# Patient Record
Sex: MALE | Race: WHITE | NOT HISPANIC OR LATINO | Employment: OTHER | URBAN - METROPOLITAN AREA
[De-identification: names, ages, dates, MRNs, and addresses within clinical notes are randomized per-mention and may not be internally consistent; named-entity substitution may affect disease eponyms.]

---

## 2019-08-20 ENCOUNTER — HOSPITAL ENCOUNTER (OUTPATIENT)
Dept: RADIOLOGY | Facility: MEDICAL CENTER | Age: 54
End: 2019-08-20
Attending: NURSE PRACTITIONER

## 2019-08-20 DIAGNOSIS — B18.2 CHRONIC HEPATITIS C WITH HEPATIC COMA (HCC): ICD-10-CM

## 2023-08-28 ENCOUNTER — APPOINTMENT (OUTPATIENT)
Dept: RADIOLOGY | Facility: MEDICAL CENTER | Age: 58
End: 2023-08-28
Attending: EMERGENCY MEDICINE
Payer: MEDICARE

## 2023-08-28 ENCOUNTER — APPOINTMENT (OUTPATIENT)
Dept: URGENT CARE | Facility: PHYSICIAN GROUP | Age: 58
End: 2023-08-28
Payer: MEDICARE

## 2023-08-28 ENCOUNTER — HOSPITAL ENCOUNTER (EMERGENCY)
Facility: MEDICAL CENTER | Age: 58
End: 2023-08-28
Attending: EMERGENCY MEDICINE
Payer: MEDICARE

## 2023-08-28 VITALS
SYSTOLIC BLOOD PRESSURE: 145 MMHG | DIASTOLIC BLOOD PRESSURE: 92 MMHG | BODY MASS INDEX: 29.74 KG/M2 | HEIGHT: 68 IN | TEMPERATURE: 98.4 F | HEART RATE: 80 BPM | OXYGEN SATURATION: 95 % | RESPIRATION RATE: 16 BRPM | WEIGHT: 196.21 LBS

## 2023-08-28 DIAGNOSIS — R10.32 LEFT LOWER QUADRANT ABDOMINAL PAIN: ICD-10-CM

## 2023-08-28 DIAGNOSIS — N20.1 URETEROLITHIASIS: ICD-10-CM

## 2023-08-28 LAB
ALBUMIN SERPL BCP-MCNC: 4.1 G/DL (ref 3.2–4.9)
ALBUMIN/GLOB SERPL: 1.7 G/DL
ALP SERPL-CCNC: 88 U/L (ref 30–99)
ALT SERPL-CCNC: 14 U/L (ref 2–50)
ANION GAP SERPL CALC-SCNC: 9 MMOL/L (ref 7–16)
APPEARANCE UR: CLEAR
AST SERPL-CCNC: 17 U/L (ref 12–45)
BASOPHILS # BLD AUTO: 0.3 % (ref 0–1.8)
BASOPHILS # BLD: 0.04 K/UL (ref 0–0.12)
BILIRUB SERPL-MCNC: 0.7 MG/DL (ref 0.1–1.5)
BILIRUB UR QL STRIP.AUTO: NEGATIVE
BUN SERPL-MCNC: 13 MG/DL (ref 8–22)
CALCIUM ALBUM COR SERPL-MCNC: 8.9 MG/DL (ref 8.5–10.5)
CALCIUM SERPL-MCNC: 9 MG/DL (ref 8.5–10.5)
CHLORIDE SERPL-SCNC: 102 MMOL/L (ref 96–112)
CO2 SERPL-SCNC: 26 MMOL/L (ref 20–33)
COLOR UR: YELLOW
CREAT SERPL-MCNC: 1.32 MG/DL (ref 0.5–1.4)
EOSINOPHIL # BLD AUTO: 0.01 K/UL (ref 0–0.51)
EOSINOPHIL NFR BLD: 0.1 % (ref 0–6.9)
ERYTHROCYTE [DISTWIDTH] IN BLOOD BY AUTOMATED COUNT: 44.7 FL (ref 35.9–50)
GFR SERPLBLD CREATININE-BSD FMLA CKD-EPI: 62 ML/MIN/1.73 M 2
GLOBULIN SER CALC-MCNC: 2.4 G/DL (ref 1.9–3.5)
GLUCOSE SERPL-MCNC: 130 MG/DL (ref 65–99)
GLUCOSE UR STRIP.AUTO-MCNC: NEGATIVE MG/DL
HCT VFR BLD AUTO: 47.5 % (ref 42–52)
HGB BLD-MCNC: 15.6 G/DL (ref 14–18)
IMM GRANULOCYTES # BLD AUTO: 0.05 K/UL (ref 0–0.11)
IMM GRANULOCYTES NFR BLD AUTO: 0.4 % (ref 0–0.9)
KETONES UR STRIP.AUTO-MCNC: ABNORMAL MG/DL
LEUKOCYTE ESTERASE UR QL STRIP.AUTO: NEGATIVE
LIPASE SERPL-CCNC: 15 U/L (ref 11–82)
LYMPHOCYTES # BLD AUTO: 0.73 K/UL (ref 1–4.8)
LYMPHOCYTES NFR BLD: 5.5 % (ref 22–41)
MCH RBC QN AUTO: 30.3 PG (ref 27–33)
MCHC RBC AUTO-ENTMCNC: 32.8 G/DL (ref 32.3–36.5)
MCV RBC AUTO: 92.2 FL (ref 81.4–97.8)
MICRO URNS: ABNORMAL
MONOCYTES # BLD AUTO: 0.41 K/UL (ref 0–0.85)
MONOCYTES NFR BLD AUTO: 3.1 % (ref 0–13.4)
NEUTROPHILS # BLD AUTO: 12.12 K/UL (ref 1.82–7.42)
NEUTROPHILS NFR BLD: 90.6 % (ref 44–72)
NITRITE UR QL STRIP.AUTO: NEGATIVE
NRBC # BLD AUTO: 0 K/UL
NRBC BLD-RTO: 0 /100 WBC (ref 0–0.2)
PH UR STRIP.AUTO: 7.5 [PH] (ref 5–8)
PLATELET # BLD AUTO: 344 K/UL (ref 164–446)
PMV BLD AUTO: 9.1 FL (ref 9–12.9)
POTASSIUM SERPL-SCNC: 4.5 MMOL/L (ref 3.6–5.5)
PROT SERPL-MCNC: 6.5 G/DL (ref 6–8.2)
PROT UR QL STRIP: NEGATIVE MG/DL
RBC # BLD AUTO: 5.15 M/UL (ref 4.7–6.1)
RBC UR QL AUTO: NEGATIVE
SODIUM SERPL-SCNC: 137 MMOL/L (ref 135–145)
SP GR UR STRIP.AUTO: 1.02
UROBILINOGEN UR STRIP.AUTO-MCNC: 1 MG/DL
WBC # BLD AUTO: 13.4 K/UL (ref 4.8–10.8)

## 2023-08-28 PROCEDURE — 80053 COMPREHEN METABOLIC PANEL: CPT

## 2023-08-28 PROCEDURE — 76870 US EXAM SCROTUM: CPT

## 2023-08-28 PROCEDURE — 700111 HCHG RX REV CODE 636 W/ 250 OVERRIDE (IP): Mod: JZ | Performed by: EMERGENCY MEDICINE

## 2023-08-28 PROCEDURE — 85025 COMPLETE CBC W/AUTO DIFF WBC: CPT

## 2023-08-28 PROCEDURE — 74176 CT ABD & PELVIS W/O CONTRAST: CPT

## 2023-08-28 PROCEDURE — 99284 EMERGENCY DEPT VISIT MOD MDM: CPT

## 2023-08-28 PROCEDURE — 83690 ASSAY OF LIPASE: CPT

## 2023-08-28 PROCEDURE — 96372 THER/PROPH/DIAG INJ SC/IM: CPT

## 2023-08-28 PROCEDURE — 81003 URINALYSIS AUTO W/O SCOPE: CPT

## 2023-08-28 RX ORDER — ONDANSETRON 4 MG/1
4 TABLET, ORALLY DISINTEGRATING ORAL EVERY 8 HOURS PRN
Qty: 10 TABLET | Refills: 0 | Status: SHIPPED | OUTPATIENT
Start: 2023-08-28

## 2023-08-28 RX ORDER — KETOROLAC TROMETHAMINE 30 MG/ML
30 INJECTION, SOLUTION INTRAMUSCULAR; INTRAVENOUS ONCE
Status: COMPLETED | OUTPATIENT
Start: 2023-08-28 | End: 2023-08-28

## 2023-08-28 RX ORDER — NAPROXEN 500 MG/1
500 TABLET ORAL 2 TIMES DAILY WITH MEALS
Qty: 20 TABLET | Refills: 0 | Status: SHIPPED | OUTPATIENT
Start: 2023-08-28

## 2023-08-28 RX ADMIN — KETOROLAC TROMETHAMINE 30 MG: 30 INJECTION, SOLUTION INTRAMUSCULAR; INTRAVENOUS at 15:02

## 2023-08-28 NOTE — ED TRIAGE NOTES
"Chief Complaint   Patient presents with    Abdominal Pain     Left sided     Testicle Pain     Left sided     Pt reports sudden onset abdominal and testicle pain that started this morning. Protocol ordered.     Pt is alert and oriented, speaking in full sentences, follows commands and responds appropriately to questions.      Pt placed in lobby. Pt educated on triage process and apologized for wait time. Pt encouraged to alert staff for any changes.     Patient and staff wearing appropriate PPE      BP (!) 155/92   Pulse 75   Temp 36.8 °C (98.3 °F) (Temporal)   Resp 16   Ht 1.727 m (5' 8\")   Wt 89 kg (196 lb 3.4 oz)   SpO2 97%       "

## 2023-08-28 NOTE — ED PROVIDER NOTES
ED Provider Note    CHIEF COMPLAINT  Chief Complaint   Patient presents with    Abdominal Pain     Left sided     Testicle Pain     Left sided       EXTERNAL RECORDS REVIEWED  Urology documentation 2023, prostatic hypertrophy, prostate tumor, impotence    HPI/ROS    Danyel Franklin is a 58 y.o. male who presents for evaluation of left-sided lower abdominal pain and left testicle pain that began acutely and severely earlier this morning.  Woke him from sleep.  Associated with vomiting.  He denies fever.  No diarrhea.  States he has a distant history of kidney stones, none since 10 years of age.  No burning or blood with urination.  No fever.  History of bipolar disorder and hepatitis as well as COPD and rheumatoid arthritis.  He also apparently has a history of prostate cancer.  He has a former cigarette smoker    PAST MEDICAL HISTORY   has a past medical history of Bipolar disorder (HCC), Chronic leg pain, COPD (chronic obstructive pulmonary disease) (HCC), Depression, Hepatitis C, Kidney stones, and Rheumatoid arthritis (HCC).    SURGICAL HISTORY   has a past surgical history that includes cystoscopy.    FAMILY HISTORY  No family history on file.    SOCIAL HISTORY  Social History     Tobacco Use    Smoking status: Former     Current packs/day: 0.00     Average packs/day: 0.5 packs/day for 41.0 years (20.5 ttl pk-yrs)     Types: Cigarettes     Start date: 1974     Quit date: 2015     Years since quittin.0    Smokeless tobacco: Not on file   Substance and Sexual Activity    Alcohol use: No    Drug use: No     Types: Intravenous     Comment: hx of IV speed use, last use .  Not currently    Sexual activity: Not on file       CURRENT MEDICATIONS  Home Medications    **Home medications have not yet been reviewed for this encounter**         ALLERGIES  No Known Allergies    PHYSICAL EXAM  VITAL SIGNS: BP (!) 155/92   Pulse 75   Temp 36.8 °C (98.3 °F) (Temporal)   Resp 16   Ht 1.727 m (5'  "8\")   Wt 89 kg (196 lb 3.4 oz)   SpO2 97%   BMI 29.83 kg/m²    Constitutional: Well appearing patient in no acute distress.  Awake and alert, not toxic nor ill in appearance.  HENT: Normocephalic, no obvious evidence of acute trauma.  Eyes: No scleral icterus. Normal conjunctiva   Thorax & Lungs: Normal nonlabored respirations. I appreciate no wheezing, rhonchi or rales. There is normal air movement.  Upon cardiac ascultation I appreciate a regular heart rhythm and a normal rate.   Abdomen: The abdomen is not visibly distended. Upon palpation, I find it to be without tenderness.  No mass appreciated.  : Unremarkable external genitalia.  No testicular tenderness or obvious symmetric edema, no scrotal erythema.  Skin: The exposed portions of skin reveal no obvious rash or other abnormalities.  Extremities/Musculoskeletal: No obvious sign of acute trauma. No asymmetric calf tenderness or edema.   Neurologic: Alert & oriented. No focal deficits observed.      DIAGNOSTIC STUDIES / PROCEDURES    LABS  Results for orders placed or performed during the hospital encounter of 08/28/23   CBC WITH DIFFERENTIAL   Result Value Ref Range    WBC 13.4 (H) 4.8 - 10.8 K/uL    RBC 5.15 4.70 - 6.10 M/uL    Hemoglobin 15.6 14.0 - 18.0 g/dL    Hematocrit 47.5 42.0 - 52.0 %    MCV 92.2 81.4 - 97.8 fL    MCH 30.3 27.0 - 33.0 pg    MCHC 32.8 32.3 - 36.5 g/dL    RDW 44.7 35.9 - 50.0 fL    Platelet Count 344 164 - 446 K/uL    MPV 9.1 9.0 - 12.9 fL    Neutrophils-Polys 90.60 (H) 44.00 - 72.00 %    Lymphocytes 5.50 (L) 22.00 - 41.00 %    Monocytes 3.10 0.00 - 13.40 %    Eosinophils 0.10 0.00 - 6.90 %    Basophils 0.30 0.00 - 1.80 %    Immature Granulocytes 0.40 0.00 - 0.90 %    Nucleated RBC 0.00 0.00 - 0.20 /100 WBC    Neutrophils (Absolute) 12.12 (H) 1.82 - 7.42 K/uL    Lymphs (Absolute) 0.73 (L) 1.00 - 4.80 K/uL    Monos (Absolute) 0.41 0.00 - 0.85 K/uL    Eos (Absolute) 0.01 0.00 - 0.51 K/uL    Baso (Absolute) 0.04 0.00 - 0.12 K/uL    " Immature Granulocytes (abs) 0.05 0.00 - 0.11 K/uL    NRBC (Absolute) 0.00 K/uL   COMP METABOLIC PANEL   Result Value Ref Range    Sodium 137 135 - 145 mmol/L    Potassium 4.5 3.6 - 5.5 mmol/L    Chloride 102 96 - 112 mmol/L    Co2 26 20 - 33 mmol/L    Anion Gap 9.0 7.0 - 16.0    Glucose 130 (H) 65 - 99 mg/dL    Bun 13 8 - 22 mg/dL    Creatinine 1.32 0.50 - 1.40 mg/dL    Calcium 9.0 8.5 - 10.5 mg/dL    Correct Calcium 8.9 8.5 - 10.5 mg/dL    AST(SGOT) 17 12 - 45 U/L    ALT(SGPT) 14 2 - 50 U/L    Alkaline Phosphatase 88 30 - 99 U/L    Total Bilirubin 0.7 0.1 - 1.5 mg/dL    Albumin 4.1 3.2 - 4.9 g/dL    Total Protein 6.5 6.0 - 8.2 g/dL    Globulin 2.4 1.9 - 3.5 g/dL    A-G Ratio 1.7 g/dL   LIPASE   Result Value Ref Range    Lipase 15 11 - 82 U/L   URINALYSIS    Specimen: Urine   Result Value Ref Range    Color Yellow     Character Clear     Specific Gravity 1.016 <1.035    Ph 7.5 5.0 - 8.0    Glucose Negative Negative mg/dL    Ketones Trace (A) Negative mg/dL    Protein Negative Negative mg/dL    Bilirubin Negative Negative    Urobilinogen, Urine 1.0 Negative    Nitrite Negative Negative    Leukocyte Esterase Negative Negative    Occult Blood Negative Negative    Micro Urine Req see below    ESTIMATED GFR   Result Value Ref Range    GFR (CKD-EPI) 62 >60 mL/min/1.73 m 2        RADIOLOGY  I have independently interpreted the diagnostic imaging associated with this visit and am waiting the final reading from the radiologist.   My preliminary interpretation is as follows: Left-sided stone  Radiologist interpretation:   CT-RENAL COLIC EVALUATION(A/P W/O)   Final Result         1. Obstructing 4 mm calculus in the left mid ureter with moderate left hydronephrosis      2. Nonobstructive bilateral renal calculi.      [id number]   Final Result      Possible LEFT orchitis            COURSE & MEDICAL DECISION MAKING    ED Observation Status? No; Patient does not meet criteria for ED Observation.     INITIAL ASSESSMENT,  COURSE AND PLAN  Care Narrative: 58-year-old male with left-sided abdominal pain into the left testicle.  No objective testicular findings on examination.  This was a cute onset of pain.  He does have a history of ureterolithiasis but has not had an episode in over 40 years.  With the acute onset of pain involving the testicle emergent ultrasound was performed excluding torsion.  It was essentially normal, possible orchitis but on examination he did not have findings concerning for isolated orchitis.  He does have a benign abdominal examination without tenderness.  Given his history of ureterolithiasis I do have high suspicion for a distal stone.  A CT scan will be obtained.  Urinalysis will be obtained.  Of note, his blood work is significant for a mild leukocytosis, normal lipase, normal renal function.  No changes in his LFTs.    CT scan does confirm the presence of a left-sided kidney stone.  Urinalysis gives no indication of infection.  Pain improved after Toradol.  The patient has an appointment with his urologist coming up soon, I will prescribe naproxen and Zofran, he prefers nonopiate-based treatment as he does a history of substance abuse in the past.  Will provide with a strainer.  He will follow-up with his urologist, strict return instructions provided  Prescription for naproxen, Zofran    DISPOSITION AND DISCUSSIONS    Escalation of care considered, and ultimately not performed: I did consider acute inpatient management although given improvement of his symptoms with an overall small stone and no evidence of UTI I think it be most appropriate for outpatient management      FINAL DIAGNOSIS  1. Ureterolithiasis    2. Left lower quadrant abdominal pain           Electronically signed by: Virgilio Cabrera M.D., 8/28/2023 2:44 PM

## 2023-08-28 NOTE — ED NOTES
DC instructions reviewed with pt. Pt verbalized understanding. Pt provided with ayaz. Pt ambulated to [facility] with steady gait.